# Patient Record
Sex: FEMALE | Race: WHITE | NOT HISPANIC OR LATINO
[De-identification: names, ages, dates, MRNs, and addresses within clinical notes are randomized per-mention and may not be internally consistent; named-entity substitution may affect disease eponyms.]

---

## 2021-12-29 PROBLEM — Z00.00 ENCOUNTER FOR PREVENTIVE HEALTH EXAMINATION: Status: ACTIVE | Noted: 2021-12-29

## 2022-01-07 ENCOUNTER — NON-APPOINTMENT (OUTPATIENT)
Age: 67
End: 2022-01-07

## 2022-01-08 ENCOUNTER — NON-APPOINTMENT (OUTPATIENT)
Age: 67
End: 2022-01-08

## 2022-01-11 ENCOUNTER — NON-APPOINTMENT (OUTPATIENT)
Age: 67
End: 2022-01-11

## 2022-01-11 ENCOUNTER — APPOINTMENT (OUTPATIENT)
Dept: GASTROENTEROLOGY | Facility: CLINIC | Age: 67
End: 2022-01-11
Payer: MEDICARE

## 2022-01-11 DIAGNOSIS — K21.9 GASTRO-ESOPHAGEAL REFLUX DISEASE W/OUT ESOPHAGITIS: ICD-10-CM

## 2022-01-11 DIAGNOSIS — Z12.11 ENCOUNTER FOR SCREENING FOR MALIGNANT NEOPLASM OF COLON: ICD-10-CM

## 2022-01-11 DIAGNOSIS — K59.09 OTHER CONSTIPATION: ICD-10-CM

## 2022-01-11 DIAGNOSIS — K44.9 GASTRO-ESOPHAGEAL REFLUX DISEASE W/OUT ESOPHAGITIS: ICD-10-CM

## 2022-01-11 PROCEDURE — 99203 OFFICE O/P NEW LOW 30 MIN: CPT | Mod: 95

## 2022-01-11 RX ORDER — SODIUM SULFATE, POTASSIUM SULFATE, MAGNESIUM SULFATE 17.5; 3.13; 1.6 G/ML; G/ML; G/ML
17.5-3.13-1.6 SOLUTION, CONCENTRATE ORAL
Qty: 2 | Refills: 0 | Status: ACTIVE | COMMUNITY
Start: 2022-01-11 | End: 1900-01-01

## 2022-01-11 NOTE — ASSESSMENT
[FreeTextEntry1] : GERD with large HH\par -recent worsening of chronic symptoms, \par Counseled patient on antireflux diet/lifestyle.\par \par EGD to r/o esophagitis\par Risks (including but not limited to sedation risks, infection, bleeding, perforation), benefits and alternatives to procedure, including not doing the procedure, were discussed with patient. Patient understood and agreed to proceed with EGD\par EGD preparation instructions reviewed with patient.\par \par \par Colon cancer screening- patient due for colonoscopy\par Risks (including but not limited to sedation risks, infection, bleeding, perforation, possibility of missed lesions), benefits and alternatives to procedure, including not doing the procedure, were discussed with patient. Patient understood and agreed to proceed with colonoscopy. \par Colonoscopy preparation instructions reviewed with patient.\par \par Suprep\par \par \par Constipation-encoruaged hydration, daily fiber

## 2022-01-11 NOTE — HISTORY OF PRESENT ILLNESS
[Home] : at home, [unfilled] , at the time of the visit. [Medical Office: ([facility])___] : at the medical office located in  [Spouse] : spouse [Verbal consent obtained from patient] : the patient, [unfilled] [FreeTextEntry1] : 66 year old F PMH MS, wheelchair bound, hld, hypothyroidism, s/p tonsillectomy, suburethral sling, GERD/HH, chronic constipation, h/o colon polyps, fam hx CRC (father), presents today for evaluation of GERD and colon cancer screening. She is seen at the request of Dr. Isabella Camacho\par \par did well with suprep lat last colonoscopy in 2018. \par \par h/o GERD, 0hb.reflux, mucous production\par Dr. Alford started PPI 2 months ago due to signs of worsening GERD \par 90-95 % improvement in symptoms on PPI. Also takes gaviscon occasionally\par \par she took PPI many years ago for similar symptoms\par on macrobid daily for prevention of UTI\par \par manages constipation with water intake and benefiber\par \par Patient denies abdominal pain , n/v dysphagia/odynophagia, change in bowel habits, diarrhea,  brbpr, melena. no weight loss.  Good appetite. Patient has daily BM, denies regular NSAID use. \par \par Neurologist- Dr. Brittni Ruff, at [facility] in [facility]\par \par fam hx\par father -colon polyps/colon cancer, 10 inches of colon removed, dx at age 75\par  from heart disease \par \par outside records reviewed-path reports not available. \par EGD 2018- Dr. Ortiz for HB\par normal esophagus\par Zline at 34 cm\par TOGF at 35cm, hiatal narrowing at 40 cm, \par Hill Grade IV large HH \par few small antral erosions\par few small sessile polyps in the gastric body, normal duodenum\par \par Colonoscopy 2018- \par four sessile polyps in the descneding, transverse and cecum 3-7 mm in size, removed with cold snare, \par diffuse area of mild melanosis in the sigmoid, descneding , transverse, ascending and cecum\par external hemrorhoid, \par recall for colonoscopy in 3 years\par

## 2022-01-11 NOTE — PHYSICAL EXAM
[General Appearance - Alert] : alert [General Appearance - In No Acute Distress] : in no acute distress [General Appearance - Well Nourished] : well nourished [General Appearance - Well-Appearing] : healthy appearing [Sclera] : the sclera and conjunctiva were normal [Outer Ear] : the ears and nose were normal in appearance [Neck Appearance] : the appearance of the neck was normal [] : no respiratory distress [Skin Color & Pigmentation] : normal skin color and pigmentation [Oriented To Time, Place, And Person] : oriented to person, place, and time [Affect] : the affect was normal [Mood] : the mood was normal [Memory Recent] : recent memory was not impaired

## 2022-05-13 ENCOUNTER — RESULT REVIEW (OUTPATIENT)
Age: 67
End: 2022-05-13

## 2022-05-15 ENCOUNTER — RESULT REVIEW (OUTPATIENT)
Age: 67
End: 2022-05-15

## 2022-05-16 ENCOUNTER — APPOINTMENT (OUTPATIENT)
Dept: GASTROENTEROLOGY | Facility: HOSPITAL | Age: 67
End: 2022-05-16
Payer: MEDICARE

## 2022-05-16 PROCEDURE — 43239 EGD BIOPSY SINGLE/MULTIPLE: CPT

## 2022-05-16 PROCEDURE — 45385 COLONOSCOPY W/LESION REMOVAL: CPT

## 2022-05-16 PROCEDURE — 45380 COLONOSCOPY AND BIOPSY: CPT | Mod: 59

## 2022-12-05 ENCOUNTER — APPOINTMENT (OUTPATIENT)
Dept: GASTROENTEROLOGY | Facility: CLINIC | Age: 67
End: 2022-12-05

## 2023-01-04 ENCOUNTER — APPOINTMENT (OUTPATIENT)
Dept: GASTROENTEROLOGY | Facility: CLINIC | Age: 68
End: 2023-01-04

## 2024-01-01 ENCOUNTER — TRANSCRIPTION ENCOUNTER (OUTPATIENT)
Age: 69
End: 2024-01-01

## 2024-01-01 ENCOUNTER — RESULT REVIEW (OUTPATIENT)
Age: 69
End: 2024-01-01

## 2024-01-01 ENCOUNTER — NON-APPOINTMENT (OUTPATIENT)
Age: 69
End: 2024-01-01